# Patient Record
Sex: MALE | Race: WHITE | URBAN - METROPOLITAN AREA
[De-identification: names, ages, dates, MRNs, and addresses within clinical notes are randomized per-mention and may not be internally consistent; named-entity substitution may affect disease eponyms.]

---

## 2021-08-02 ENCOUNTER — OFFICE VISIT (OUTPATIENT)
Dept: URBAN - METROPOLITAN AREA CLINIC 44 | Facility: CLINIC | Age: 81
End: 2021-08-02
Payer: COMMERCIAL

## 2021-08-02 DIAGNOSIS — Z96.1 PRESENCE OF INTRAOCULAR LENS: ICD-10-CM

## 2021-08-02 DIAGNOSIS — H04.123 TEAR FILM INSUFFICIENCY OF BILATERAL LACRIMAL GLANDS: ICD-10-CM

## 2021-08-02 DIAGNOSIS — H43.813 VITREOUS DEGENERATION, BILATERAL: ICD-10-CM

## 2021-08-02 DIAGNOSIS — H25.811 COMBINED FORMS OF AGE-RELATED CATARACT, RIGHT EYE: ICD-10-CM

## 2021-08-02 PROCEDURE — 92133 CPTRZD OPH DX IMG PST SGM ON: CPT | Performed by: OPTOMETRIST

## 2021-08-02 PROCEDURE — 92134 CPTRZ OPH DX IMG PST SGM RTA: CPT | Performed by: OPTOMETRIST

## 2021-08-02 PROCEDURE — 92004 COMPRE OPH EXAM NEW PT 1/>: CPT | Performed by: OPTOMETRIST

## 2021-08-02 RX ORDER — TIMOLOL MALEATE 5 MG/ML
0.5 % SOLUTION/ DROPS OPHTHALMIC
Qty: 5 | Refills: 3 | Status: INACTIVE
Start: 2021-08-02 | End: 2021-08-04

## 2021-08-02 RX ORDER — LATANOPROST 50 UG/ML
0.005 % SOLUTION OPHTHALMIC
Qty: 5 | Refills: 3 | Status: INACTIVE
Start: 2021-08-02 | End: 2021-11-16

## 2021-08-02 RX ORDER — BRIMONIDINE TARTRATE 2 MG/ML
0.2 % SOLUTION/ DROPS OPHTHALMIC
Qty: 5 | Refills: 3 | Status: INACTIVE
Start: 2021-08-02 | End: 2021-11-16

## 2021-08-02 ASSESSMENT — INTRAOCULAR PRESSURE
OD: 18
OS: 4

## 2021-08-02 ASSESSMENT — VISUAL ACUITY
OD: 20/30
OS: LP

## 2021-08-02 ASSESSMENT — KERATOMETRY
OD: 44.50
OS: 43.00

## 2021-08-02 NOTE — IMPRESSION/PLAN
Impression: Vitreous degeneration, bilateral: H43.813. Reports no new floaters, flashes  or veiling. No retinal defects noted. Patient reports occasional floaters which are not interfering with daily activities and vision. Plan: PLAN: Discussed findings and observe.  RTC STAT if experiences increase in floaters, flashes or veiling

## 2021-08-02 NOTE — IMPRESSION/PLAN
Impression: Combined forms of age-related cataract, right eye: H25.811. Visually non-significant cataracts. Patient asymptomatic and happy with current level of vision. Plan: PLAN: RTC 12 months for complete exam DFE/OCT (Mac). RTC sooner if patient symptoms become worse.

## 2021-08-02 NOTE — IMPRESSION/PLAN
Impression: Presence of intraocular lens: Z96.1. IOL centered and clear Plan: PLAN: Rx PRN. RTC 12 months for complete. Check position of IOL.

## 2021-08-02 NOTE — IMPRESSION/PLAN
Impression: Primary open-angle glaucoma, severe stage, bilateral: H40.1133. Vertical cupping . 77OD, .89OS. OD with abnormal RNFL loss (S, I, T), OS with abnormal RNFL loss ( S,I). Average 62OD, 61OS,  Fm Hx, Good IOP OU today. OD Stable.  OS Stable Plan: PLAN: Cont Latanoprost 1 drop before bed, Brimonodine 1 drop TID, and timilol 1 drop in AM discussed w patient ok to only do drops OD due to low IOP OS RTC 3-4 months for IOP+ 24-2 VF

## 2021-08-02 NOTE — IMPRESSION/PLAN
Impression: Tear film insufficiency of bilateral lacrimal glands: H04.123. Clinical evaluation shows mild DED signs. Patient reports no or occasional symptoms not interfering with daily activities. Plan: PLAN: Warm compresses for 10 minutes AM (Ricardo Mask or equal). Recommend Lipid based tears to be used 4X daily. RTC if symptom's worsen.

## 2021-11-22 ENCOUNTER — OFFICE VISIT (OUTPATIENT)
Dept: URBAN - METROPOLITAN AREA CLINIC 44 | Facility: CLINIC | Age: 81
End: 2021-11-22
Payer: COMMERCIAL

## 2021-11-22 DIAGNOSIS — H40.1133 PRIMARY OPEN-ANGLE GLAUCOMA, SEVERE STAGE, BILATERAL: Primary | ICD-10-CM

## 2021-11-22 PROCEDURE — 99213 OFFICE O/P EST LOW 20 MIN: CPT | Performed by: OPTOMETRIST

## 2021-11-22 PROCEDURE — 92083 EXTENDED VISUAL FIELD XM: CPT | Performed by: OPTOMETRIST

## 2021-11-22 ASSESSMENT — INTRAOCULAR PRESSURE
OD: 11
OS: 3

## 2021-11-22 NOTE — IMPRESSION/PLAN
Impression: Primary open-angle glaucoma, severe stage, bilateral: H40.1133. Vertical cupping . 77OD, .89OS. OD with abnormal RNFL loss (S, I, T), OS with abnormal RNFL loss ( S,I). Average 62OD, 61OS,  VF # 1 OD with superior and inferior arcuate defects (VFI 80%). + Fm Hx, Good IOP OU today. OD Stable. OS Stable Plan: PLAN: Cont Latanoprost 1 drop before bed, Brimonidine 1 drop TID, and timolol  1 drop in AM discussed w patient ok to only do drops OD due to low IOP and LP acuity OS.   RTC 4 months for IOP+ repeat 24-2 VF

## 2022-03-23 ENCOUNTER — OFFICE VISIT (OUTPATIENT)
Dept: URBAN - METROPOLITAN AREA CLINIC 44 | Facility: CLINIC | Age: 82
End: 2022-03-23
Payer: MEDICARE

## 2022-03-23 PROCEDURE — 92083 EXTENDED VISUAL FIELD XM: CPT | Performed by: OPTOMETRIST

## 2022-03-23 PROCEDURE — 99213 OFFICE O/P EST LOW 20 MIN: CPT | Performed by: OPTOMETRIST

## 2022-03-23 ASSESSMENT — INTRAOCULAR PRESSURE
OS: 2
OD: 16

## 2022-03-23 NOTE — IMPRESSION/PLAN
Impression: Primary open-angle glaucoma, severe stage, bilateral: H40.1133.
=
-Vertical cupping . 77OD, .89OS. OD with abnormal RNFL loss (S, I, T), OS with abnormal RNFL loss ( S,I). Average 62OD, 61OS,  
-VF # 1 OD with superior and inferior arcuate defects (VFI 79% 3/22 vs 80%11/21) OS w VF loss sparing temp field (VFI 12% 3/22) + Fm Hx, Plan: PLAN: Cont Latanoprost 1 drop before bed, Brimonidine 1 drop TID, and timolol  1 drop in AM discussed w patient ok to only do drops OD due to low IOP and LP acuity OS.   RTC 4 months for IOP check urinary retention

## 2022-07-25 ENCOUNTER — OFFICE VISIT (OUTPATIENT)
Dept: URBAN - METROPOLITAN AREA CLINIC 44 | Facility: CLINIC | Age: 82
End: 2022-07-25
Payer: MEDICARE

## 2022-07-25 DIAGNOSIS — H40.1133 PRIMARY OPEN-ANGLE GLAUCOMA, SEVERE STAGE, BILATERAL: Primary | ICD-10-CM

## 2022-07-25 DIAGNOSIS — H25.811 COMBINED FORMS OF AGE-RELATED CATARACT, RIGHT EYE: ICD-10-CM

## 2022-07-25 PROCEDURE — 99212 OFFICE O/P EST SF 10 MIN: CPT | Performed by: OPTOMETRIST

## 2022-07-25 ASSESSMENT — INTRAOCULAR PRESSURE
OS: 4
OD: 12

## 2022-07-25 NOTE — IMPRESSION/PLAN
Impression: Combined forms of age-related cataract, right eye: H25.811. Visually non-significant cataracts. Patient asymptomatic and happy with current level of vision. Plan: PLAN: RTC 4 months for complete exam DFE/OCT (Mac). RTC sooner if patient symptoms become worse.

## 2023-01-05 ENCOUNTER — OFFICE VISIT (OUTPATIENT)
Dept: URBAN - METROPOLITAN AREA CLINIC 44 | Facility: CLINIC | Age: 83
End: 2023-01-05
Payer: MEDICARE

## 2023-01-05 DIAGNOSIS — H40.1133 PRIMARY OPEN-ANGLE GLAUCOMA, SEVERE STAGE, BILATERAL: Primary | ICD-10-CM

## 2023-01-05 DIAGNOSIS — Z96.1 PRESENCE OF INTRAOCULAR LENS: ICD-10-CM

## 2023-01-05 DIAGNOSIS — H25.811 COMBINED FORMS OF AGE-RELATED CATARACT, RIGHT EYE: ICD-10-CM

## 2023-01-05 PROCEDURE — 92133 CPTRZD OPH DX IMG PST SGM ON: CPT | Performed by: OPTOMETRIST

## 2023-01-05 PROCEDURE — 99214 OFFICE O/P EST MOD 30 MIN: CPT | Performed by: OPTOMETRIST

## 2023-01-05 ASSESSMENT — INTRAOCULAR PRESSURE
OS: 2
OD: 17

## 2023-01-05 ASSESSMENT — KERATOMETRY
OD: 44.63
OS: 42.25

## 2023-01-05 ASSESSMENT — VISUAL ACUITY: OD: 20/30

## 2023-01-05 NOTE — IMPRESSION/PLAN
Impression: Primary open-angle glaucoma, severe stage, bilateral: H40.1133. =IOP OD 17, OS 4. Stable 
-Vertical cupping OD .77, OS .89 (8/21). OD with stable  RNFL loss (S, I, T), OS with abnormal RNFL loss ( S,I). Average OD 58  1/23 vs 62 8/21, OS 61 (8/21),  
-VF # 1 OD with superior and inferior arcuate defects (VFI 79% 3/22 vs 80%11/21) OS w VF loss sparing temp field (VFI 12% 3/22)   - Positive Fm Hx, Plan: PLAN: Cont Latanoprost 1 drop before bed, Brimonidine 1 drop TID, and Timolol  1 drop in AM  OD only. No medications OS. RTC 6 months for 24-2 VF OD only + IOP OU + RNFL.

## 2023-01-05 NOTE — IMPRESSION/PLAN
Impression: Presence of intraocular lens: Z96.1. 
OS IOL centered and clear Plan: PLAN: RTC 12 months for complete. Check position of IOL.

## 2023-01-05 NOTE — IMPRESSION/PLAN
Impression: Combined forms of age-related cataract, right eye: H25.811. Borderline visually significant cataracts. Patient asymptomatic and comfortable with current level of vision. Plan: PLAN: RTC 12 months for complete exam.  RTC sooner if patient symptoms become worse.

## 2023-07-10 ENCOUNTER — OFFICE VISIT (OUTPATIENT)
Dept: URBAN - METROPOLITAN AREA CLINIC 44 | Facility: CLINIC | Age: 83
End: 2023-07-10
Payer: MEDICARE

## 2023-07-10 DIAGNOSIS — Z96.1 PRESENCE OF INTRAOCULAR LENS: ICD-10-CM

## 2023-07-10 DIAGNOSIS — H40.1133 PRIMARY OPEN-ANGLE GLAUCOMA, SEVERE STAGE, BILATERAL: Primary | ICD-10-CM

## 2023-07-10 PROCEDURE — 99213 OFFICE O/P EST LOW 20 MIN: CPT | Performed by: OPTOMETRIST

## 2023-07-10 PROCEDURE — 92133 CPTRZD OPH DX IMG PST SGM ON: CPT | Performed by: OPTOMETRIST

## 2023-07-10 PROCEDURE — 92083 EXTENDED VISUAL FIELD XM: CPT | Performed by: OPTOMETRIST

## 2023-07-10 ASSESSMENT — INTRAOCULAR PRESSURE
OS: 2
OD: 13

## 2023-07-10 NOTE — IMPRESSION/PLAN
Impression: Primary open-angle glaucoma, severe stage, bilateral: H40.1133. =IOP OD 13, OS 2. Stable 
-Vertical cupping OD . 81, OS .82. OD with stable RNFL loss (S, I, T), OS with abnormal RNFL loss ( S,I,t). Average OD 60 OS 60  
-VF # 1 OD with superior and inferior arcuate defects (VFI 63% 7/23 vs 79% 3/22 vs 80%11/21) *OS w VF loss sparing temp field (VFI 12% 3/22) -Positive Fm Hx, Plan: PLAN: 
-Cont Latanoprost 1 drop before bed, Brimonidine 1 drop TID, and Timolol  1 drop in AM  OD only. No medications OS. -RTC 3 months for 24-2 VF OD only + IOP check.

## 2023-10-23 ENCOUNTER — OFFICE VISIT (OUTPATIENT)
Dept: URBAN - METROPOLITAN AREA CLINIC 44 | Facility: CLINIC | Age: 83
End: 2023-10-23
Payer: MEDICARE

## 2023-10-23 DIAGNOSIS — H40.1133 PRIMARY OPEN-ANGLE GLAUCOMA, SEVERE STAGE, BILATERAL: Primary | ICD-10-CM

## 2023-10-23 DIAGNOSIS — T15.02XA FOREIGN BODY IN CORNEA, LEFT EYE: ICD-10-CM

## 2023-10-23 PROCEDURE — 99213 OFFICE O/P EST LOW 20 MIN: CPT | Performed by: OPTOMETRIST

## 2023-10-23 PROCEDURE — 92083 EXTENDED VISUAL FIELD XM: CPT | Performed by: OPTOMETRIST

## 2023-10-23 ASSESSMENT — INTRAOCULAR PRESSURE
OD: 18
OS: 2

## 2024-01-30 ENCOUNTER — OFFICE VISIT (OUTPATIENT)
Dept: URBAN - METROPOLITAN AREA CLINIC 44 | Facility: CLINIC | Age: 84
End: 2024-01-30
Payer: MEDICARE

## 2024-01-30 DIAGNOSIS — H40.1133 PRIMARY OPEN-ANGLE GLAUCOMA, BILATERAL, SEVERE STAGE: ICD-10-CM

## 2024-01-30 DIAGNOSIS — H25.811 COMBINED FORMS OF AGE-RELATED CATARACT, RIGHT EYE: ICD-10-CM

## 2024-01-30 DIAGNOSIS — H04.123 TEAR FILM INSUFFICIENCY OF BILATERAL LACRIMAL GLANDS: Primary | ICD-10-CM

## 2024-01-30 DIAGNOSIS — H52.4 PRESBYOPIA: ICD-10-CM

## 2024-01-30 DIAGNOSIS — H43.813 VITREOUS DEGENERATION, BILATERAL: ICD-10-CM

## 2024-01-30 PROCEDURE — 99214 OFFICE O/P EST MOD 30 MIN: CPT | Performed by: OPTOMETRIST

## 2024-01-30 PROCEDURE — 92133 CPTRZD OPH DX IMG PST SGM ON: CPT | Performed by: OPTOMETRIST

## 2024-01-30 PROCEDURE — 92134 CPTRZ OPH DX IMG PST SGM RTA: CPT | Performed by: OPTOMETRIST

## 2024-01-30 ASSESSMENT — VISUAL ACUITY
OD: 20/30
OS: LP

## 2024-01-30 ASSESSMENT — INTRAOCULAR PRESSURE
OS: 2
OD: 15

## 2024-01-30 ASSESSMENT — KERATOMETRY
OS: 43.63
OD: 44.63

## 2024-03-10 NOTE — IMPRESSION/PLAN
Impression: Primary open-angle glaucoma, severe stage, bilateral: H40.1133. =IOP OD 12, OS 4. Stable 
-Vertical cupping . 77OD, .89OS. OD with abnormal RNFL loss (S, I, T), OS with abnormal RNFL loss ( S,I). Average 62OD, 61OS,  
-VF # 1 OD with superior and inferior arcuate defects (VFI 79% 3/22 vs 80%11/21) OS w VF loss sparing temp field (VFI 12% 3/22) + Fm Hx, Plan: PLAN: Cont Latanoprost 1 drop before bed, Brimonidine 1 drop TID, and timolol  1 drop in AM discussed w patient ok to only do drops OD due to low IOP and LP acuity OS. RTC 4 months for complete + RNFL OD. no

## 2024-05-30 ENCOUNTER — OFFICE VISIT (OUTPATIENT)
Dept: URBAN - METROPOLITAN AREA CLINIC 44 | Facility: CLINIC | Age: 84
End: 2024-05-30
Payer: MEDICARE

## 2024-05-30 DIAGNOSIS — H40.1133 PRIMARY OPEN-ANGLE GLAUCOMA, BILATERAL, SEVERE STAGE: Primary | ICD-10-CM

## 2024-05-30 PROCEDURE — 99214 OFFICE O/P EST MOD 30 MIN: CPT | Performed by: OPTOMETRIST

## 2024-05-30 PROCEDURE — 92083 EXTENDED VISUAL FIELD XM: CPT | Performed by: OPTOMETRIST

## 2024-05-30 ASSESSMENT — INTRAOCULAR PRESSURE
OD: 17
OD: 18
OS: 4

## 2025-06-02 ENCOUNTER — OFFICE VISIT (OUTPATIENT)
Dept: URBAN - METROPOLITAN AREA CLINIC 44 | Facility: CLINIC | Age: 85
End: 2025-06-02
Payer: MEDICARE

## 2025-06-02 DIAGNOSIS — H40.1133 PRIMARY OPEN-ANGLE GLAUCOMA, SEVERE STAGE, BILATERAL: Primary | ICD-10-CM

## 2025-06-02 DIAGNOSIS — H25.811 COMBINED FORMS OF AGE-RELATED CATARACT, RIGHT EYE: ICD-10-CM

## 2025-06-02 DIAGNOSIS — H52.4 PRESBYOPIA: ICD-10-CM

## 2025-06-02 PROCEDURE — 92133 CPTRZD OPH DX IMG PST SGM ON: CPT | Performed by: OPTOMETRIST

## 2025-06-02 PROCEDURE — 92014 COMPRE OPH EXAM EST PT 1/>: CPT | Performed by: OPTOMETRIST

## 2025-06-02 ASSESSMENT — KERATOMETRY
OD: 44.38
OS: 43.75

## 2025-06-02 ASSESSMENT — INTRAOCULAR PRESSURE
OS: 14
OD: 20

## 2025-06-02 ASSESSMENT — VISUAL ACUITY
OD: 20/40
OS: HM